# Patient Record
Sex: MALE | Race: WHITE | NOT HISPANIC OR LATINO | Employment: FULL TIME | URBAN - METROPOLITAN AREA
[De-identification: names, ages, dates, MRNs, and addresses within clinical notes are randomized per-mention and may not be internally consistent; named-entity substitution may affect disease eponyms.]

---

## 2021-06-02 ENCOUNTER — OFFICE VISIT (OUTPATIENT)
Dept: ORTHOPEDICS | Facility: CLINIC | Age: 56
End: 2021-06-02
Payer: COMMERCIAL

## 2021-06-02 ENCOUNTER — ANCILLARY PROCEDURE (OUTPATIENT)
Dept: GENERAL RADIOLOGY | Facility: CLINIC | Age: 56
End: 2021-06-02
Attending: FAMILY MEDICINE
Payer: COMMERCIAL

## 2021-06-02 VITALS
HEIGHT: 76 IN | SYSTOLIC BLOOD PRESSURE: 136 MMHG | WEIGHT: 185.2 LBS | DIASTOLIC BLOOD PRESSURE: 89 MMHG | BODY MASS INDEX: 22.55 KG/M2

## 2021-06-02 DIAGNOSIS — G89.29 CHRONIC PAIN IN RIGHT SHOULDER: Primary | ICD-10-CM

## 2021-06-02 DIAGNOSIS — M25.511 RIGHT SHOULDER PAIN: ICD-10-CM

## 2021-06-02 DIAGNOSIS — M25.511 CHRONIC PAIN IN RIGHT SHOULDER: Primary | ICD-10-CM

## 2021-06-02 PROCEDURE — 99203 OFFICE O/P NEW LOW 30 MIN: CPT | Performed by: FAMILY MEDICINE

## 2021-06-02 PROCEDURE — 73030 X-RAY EXAM OF SHOULDER: CPT | Mod: RT | Performed by: RADIOLOGY

## 2021-06-02 RX ORDER — AMLODIPINE BESYLATE 5 MG/1
TABLET ORAL
COMMUNITY
Start: 2020-11-06

## 2021-06-02 RX ORDER — LOSARTAN POTASSIUM 25 MG/1
25 TABLET ORAL
COMMUNITY
Start: 2020-12-02 | End: 2021-12-02

## 2021-06-02 ASSESSMENT — MIFFLIN-ST. JEOR: SCORE: 1777.59

## 2021-06-02 NOTE — PATIENT INSTRUCTIONS
Thank you for choosing [facility]    DR LARSEN'S CLINIC LOCATIONS  [place] [address] [address]. [facility] [address] [facility], 4th [place]   [place], [place], [address] [place] [address]   [phone]       APPOINTMENTS: [phone]    BILLING QUESTIONS: [phone]    FAX NUMBER: [phone]        Follow up: follow up for in person, telephone, or virtual visit after your MRI to review results.       1. Chronic pain in right shoulder        An order for an MRI was placed today. You may call directly to schedule at [phone] at your convenience.

## 2021-06-02 NOTE — PROGRESS NOTES
"CHIEF COMPLAINT:  Pain of the Right Shoulder       HISTORY OF PRESENT ILLNESS  Mr. Mitchell is a pleasant 55 year old year old male who presents to clinic today with right shoulder pain.  Demetrius explains that he works for [employer] for the past 32 years and over the years his job has \"taken it's toll\".   He states at work he has to often lift heavy boxes of paper above his head.     Onset: gradual, worsening  Location: right shoulder, lateral shoulder, points to acromion process  Quality:  Aching while sleeping, sharp and shooting with certain movements.   Duration: 8-12 months   Severity: 8/10 at worst  Timing:intermittent episodes depending on activity.   Modifying factors: worse with lateral shoulder abduction resting and non-use makes it better, movement and use makes it worse  Associated signs & symptoms: clicking, popping, grinding, no catching  Previous similar pain: No  Treatments to date: some physical therapy just before COVID.     Additional history: as documented    Review of Systems:    Have you recently had a a fever, chills, weight loss? No    Do you have any vision problems? No    Do you have any chest pain or edema? No    Do you have any shortness of breath or wheezing?  No    Do you have stomach problems? No    Do you have any numbness or focal weakness? No    Do you have diabetes? No    Do you have problems with bleeding or clotting? No    Do you have an rashes or other skin lesions? No    MEDICAL HISTORY  There is no problem list on file for this patient.      Current Outpatient Medications   Medication Sig Dispense Refill     amLODIPine (NORVASC) 5 MG tablet TK 1 T PO D       losartan (COZAAR) 25 MG tablet Take 25 mg by mouth         Allergies   Allergen Reactions     Amoxicillin      PN: LW Reaction: HIVES       No family history on file.    Additional medical/Social/Surgical histories reviewed in [facility] and updated as appropriate.       PHYSICAL EXAM  /89   Ht 1.924 m (6' 3.75\")   Wt 84 kg " (185 lb 3.2 oz)   BMI 22.69 kg/m      General  - normal appearance, in no obvious distress  Musculoskeletal - Right shoulder  - inspection: normal bone and joint alignment, no obvious deformity, no scapular winging, no AC step-off  - palpation: mildly tender RC insertion, normal clavicle, non-tender AC joint, palpable prominence of distal calvicle.  palpable crepitus deep in shoulder.  - ROM:  painful and limited flexion and ER at end range, limited IR, audible crepitus,   - strength: 5/5  strength, 5/5 in all shoulder planes  - special tests:  (-) Speed's  (-) Neer  (-) Hawkin's  (+) Kody's pain and weakness  (-) [place]'s  (-) apprehension  (-) subscap lift-off  Neuro  - no sensory or motor deficit, grossly normal coordination, normal muscle tone  Skin  - no ecchymosis, erythema, warmth, or induration, no obvious rash  Psych  - interactive, appropriate, normal mood and affect    IMAGING : XR Shoulder Right 3V. Final results and radiologist's interpretation, available in the [facility] health record. Images were reviewed with the patient/family members in the office today. My personal interpretation of the performed imaging is no acute osseous abnormality.  Prominent distal clavicle.      ASSESSMENT & PLAN  Mr. Mitchell is a 55 year old year old male who presents to clinic today with chronic right shoulder pain, crepitation.  Demetrius states pain has been present over the past two years, refractory to physical therapy prior to pandemic at outside facility.    Diagnosis: Chronic pain of right shoulder with exacerbation    Given lack of improvement, I will pursue MRI right shoulder as he has been experiencing painful crepitation, weakness and pain affecting his quality of sleep.  Follow up after MRI right shoulder virtually or in person.    It was a pleasure seeing Demetrius today.    Dennis Hayes, , [facility]  Primary Care Sports Medicine

## 2021-06-02 NOTE — LETTER
"    6/2/2021         RE: Demetrius Mitchell  [address]  [place] [facility] [address]        Dear Colleague,    Thank you for referring your patient, Demetrius Mitchell, to the [facility] [place]. Please see a copy of my visit note below.    CHIEF COMPLAINT:  Pain of the Right Shoulder       HISTORY OF PRESENT ILLNESS  Mr. Mitchell is a pleasant 55 year old year old male who presents to clinic today with right shoulder pain.  Demetrius explains that he works for [employer] for the past 32 years and over the years his job has \"taken it's toll\".   He states at work he has to often lift heavy boxes of paper above his head.     Onset: gradual, worsening  Location: right shoulder, lateral shoulder, points to acromion process  Quality:  Aching while sleeping, sharp and shooting with certain movements.   Duration: 8-12 months   Severity: 8/10 at worst  Timing:intermittent episodes depending on activity.   Modifying factors: worse with lateral shoulder abduction resting and non-use makes it better, movement and use makes it worse  Associated signs & symptoms: clicking, popping, grinding, no catching  Previous similar pain: No  Treatments to date: some physical therapy just before COVID.     Additional history: as documented    Review of Systems:    Have you recently had a a fever, chills, weight loss? No    Do you have any vision problems? No    Do you have any chest pain or edema? No    Do you have any shortness of breath or wheezing?  No    Do you have stomach problems? No    Do you have any numbness or focal weakness? No    Do you have diabetes? No    Do you have problems with bleeding or clotting? No    Do you have an rashes or other skin lesions? No    MEDICAL HISTORY  There is no problem list on file for this patient.      Current Outpatient Medications   Medication Sig Dispense Refill     amLODIPine (NORVASC) 5 MG tablet TK 1 T PO D       losartan (COZAAR) 25 MG tablet Take 25 mg by mouth   " "      Allergies   Allergen Reactions     Amoxicillin      PN: LW Reaction: HIVES       No family history on file.    Additional medical/Social/Surgical histories reviewed in [facility] and updated as appropriate.       PHYSICAL EXAM  /89   Ht 1.924 m (6' 3.75\")   Wt 84 kg (185 lb 3.2 oz)   BMI 22.69 kg/m      General  - normal appearance, in no obvious distress  Musculoskeletal - Right shoulder  - inspection: normal bone and joint alignment, no obvious deformity, no scapular winging, no AC step-off  - palpation: mildly tender RC insertion, normal clavicle, non-tender AC joint, palpable prominence of distal calvicle.  palpable crepitus deep in shoulder.  - ROM:  painful and limited flexion and ER at end range, limited IR, audible crepitus,   - strength: 5/5  strength, 5/5 in all shoulder planes  - special tests:  (-) Speed's  (-) Neer  (-) Hawkin's  (+) Kody's pain and weakness  (-) [place]'s  (-) apprehension  (-) subscap lift-off  Neuro  - no sensory or motor deficit, grossly normal coordination, normal muscle tone  Skin  - no ecchymosis, erythema, warmth, or induration, no obvious rash  Psych  - interactive, appropriate, normal mood and affect    IMAGING : XR Shoulder Right 3V. Final results and radiologist's interpretation, available in the [facility] health record. Images were reviewed with the patient/family members in the office today. My personal interpretation of the performed imaging is no acute osseous abnormality.  Prominent distal clavicle.      ASSESSMENT & PLAN  Mr. Mitchell is a 55 year old year old male who presents to clinic today with chronic right shoulder pain, crepitation.  Demetrius states pain has been present over the past two years, refractory to physical therapy prior to pandemic at outside facility.    Diagnosis: Chronic pain of right shoulder with exacerbation    Given lack of improvement, I will pursue MRI right shoulder as he has been experiencing painful crepitation, weakness and pain " affecting his quality of sleep.  Follow up after MRI right shoulder virtually or in person.    It was a pleasure seeing Demetrius today.    Dennis Hayes DO, [facility]  Primary Care Sports Medicine        Again, thank you for allowing me to participate in the care of your patient.        Sincerely,        Dennis Hayes DO

## 2021-06-22 ENCOUNTER — HOSPITAL ENCOUNTER (OUTPATIENT)
Dept: MRI IMAGING | Facility: CLINIC | Age: 56
Discharge: HOME | End: 2021-06-22
Attending: FAMILY MEDICINE | Admitting: FAMILY MEDICINE
Payer: COMMERCIAL

## 2021-06-22 DIAGNOSIS — M25.511 CHRONIC PAIN IN RIGHT SHOULDER: ICD-10-CM

## 2021-06-22 DIAGNOSIS — G89.29 CHRONIC PAIN IN RIGHT SHOULDER: ICD-10-CM

## 2021-06-22 PROCEDURE — 73221 MRI JOINT UPR EXTREM W/O DYE: CPT | Mod: RT

## 2021-06-22 PROCEDURE — 73221 MRI JOINT UPR EXTREM W/O DYE: CPT | Mod: 26 | Performed by: RADIOLOGY

## 2021-06-30 ENCOUNTER — OFFICE VISIT (OUTPATIENT)
Dept: ORTHOPEDICS | Facility: CLINIC | Age: 56
End: 2021-06-30
Payer: COMMERCIAL

## 2021-06-30 VITALS — WEIGHT: 185 LBS | BODY MASS INDEX: 22.53 KG/M2 | HEIGHT: 76 IN

## 2021-06-30 DIAGNOSIS — G89.29 CHRONIC PAIN IN RIGHT SHOULDER: Primary | ICD-10-CM

## 2021-06-30 DIAGNOSIS — M25.511 CHRONIC PAIN IN RIGHT SHOULDER: Primary | ICD-10-CM

## 2021-06-30 PROCEDURE — 99213 OFFICE O/P EST LOW 20 MIN: CPT | Performed by: FAMILY MEDICINE

## 2021-06-30 ASSESSMENT — MIFFLIN-ST. JEOR: SCORE: 1776.68

## 2021-06-30 NOTE — LETTER
6/30/2021         RE: Demetrius Mitchell  [address]  [place] [facility] [place] [address]        Dear Colleague,    Thank you for referring your patient, Demetrius Mitchell, to the [facility]TS [facility]ICINE CLINIC [place]. Please see a copy of my visit note below.    ESTABLISHED PATIENT FOLLOW-UP:  Follow Up, Pain, and Results of the Right Shoulder       HISTORY OF PRESENT ILLNESS  Mr. Mitchell is a pleasant 55 year old year old male who presents to clinic today for follow-up of right shoulder pain. He is here to review a right shoulder MRI.    Date of injury/onset: 8-12 months of chronic right shoulder pain  Date last seen: 6/2/2021  Following Therapeutic Plan: completed MRI 6/22/21  Pain: Minimal change in overall - located over deep posterior shoulder  Function: no change    Review of Systems:  A 10-point review of systems was obtained and is negative except for as noted in the HPI.       MEDICAL HISTORY  There is no problem list on file for this patient.      Current Outpatient Medications   Medication Sig Dispense Refill     amLODIPine (NORVASC) 5 MG tablet TK 1 T PO D       losartan (COZAAR) 25 MG tablet Take 25 mg by mouth         Allergies   Allergen Reactions     Amoxicillin      PN: LW Reaction: HIVES       Family History   Problem Relation Age of Onset     Breast Cancer Mother      Allergies Father        Additional medical/Social/Surgical histories reviewed in [facility] and updated as appropriate.       PHYSICAL EXAM  There were no vitals taken for this visit.    Deferred    IMAGING : MRI right shoulder without contrast    Results for orders placed or performed during the hospital encounter of 06/22/21   MR Shoulder Right w/o Contrast    Narrative    EXAM: MR Right shoulder without  contrast 6/22/2021 4:24 PM    TECHNIQUE: Multiplanar, multisequence imaging of the right shoulder  were obtained without administration of intravenous or intra-articular  gadolinium contrast using routine protocol.    History:  Chronic pain in right shoulder; Chronic pain in right  shoulder     Comparison: Radiographs 6/2/2021    Findings:    ROTATOR CUFF and ASSOCIATED STRUCTURES  Rotator cuff: Tendinosis of the supraspinatus with full-thickness and  full width tearing with muscular tendinous retraction to the level of  the center of the humeral head. Infraspinatus tendinosis with  low-grade articular sided tearing of the anterior fibers at the  footprint. Teres minor is intact. Subscapularis tendinosis with  low-grade articular sided tearing of the upper fibers at the  footprint.    Bursa: Moderate fluid in the subacromial-subdeltoid bursa with debris.    Musculature: Muscle bulk of rotator cuff is preserved.  Deltoid muscle  bulk is also preserved.  No muscle edema.    Acromioclavicular joint  There are mild degenerative changes of the acromioclavicular joint.  Acromion is type 2 in sagittal morphology.  Coracoacromial ligament is  not thickened.    OSSEOUS STRUCTURES  No fracture, marrow contusion or marrow infiltration. The marrow  reconversion of the humeral shaft.    LONG BICIPITAL TENDON  The long head of the biceps tendon is normally situated within the  bicipital groove. No complete or partial biceps tendon tear is  present. Moderate fluid about the proximal extra-articular long head  of the biceps tendon.    GLENOHUMERAL JOINT  Joint fluid: Moderate joint effusion.    Cartilage and subarticular bone:  No focal hyaline cartilage defects  are noted. No Hill-Sachs, reverse Hill-Sachs, or bony [place] lesions  are seen.    Labrum: Limited assessment on this study with relative lack of joint  distention shows no labral tear.    ANCILLARY FINDINGS:  None      Impression    Impression:    1. Tendinosis of the supraspinatus with full-thickness and full width  tearing with myotendinous retraction to the level of the center of the  humeral head.     2. Infraspinatus tendinosis with low-grade articular sided tearing of  the anterior fibers  at the footprint.    3. Subscapularis tendinosis with low-grade articular sided tearing of  the upper fibers at the footprint.    4. Tenosynovitis of the extra-articular long head of the biceps  tendon.    5. Moderate joint effusion. Subacromial subdeltoid bursitis.    PALOMA SARMIENTO MD (Joe)        ASSESSMENT & PLAN  Mr. Mitchell is a 55 year old year old male who presents to clinic today with Follow Up, Pain, and Results of the Right Shoulder    Diagnosis:  Full  Thickness, full width supraspinatus tear of right shoulder    Demetrius I had a alphonse discussion about the nature of his supraspinatus tear.  Given that is a full-thickness, full width tear has been recalcitrant to formal physical therapy in the past, I do think he would best be served with a surgical consultation.  He does have additional tenosynovitis of his biceps, so tenodesis may also be considered.    He will continue with conservative measures, I will refer him on to see Dr. Jarquin done in [place].  All questions were answered to her satisfaction    It was a pleasure seeing Demetrius.    Dennis Hayes DO, [facility]  Primary Care Sports Medicine          Again, thank you for allowing me to participate in the care of your patient.        Sincerely,        Dennis Hayes DO

## 2021-07-12 NOTE — TELEPHONE ENCOUNTER
RECORDS RECEIVED FROM: Chronic pain in right shoulder/MRI/XR/Dennis Hayes DO/[facility]   DATE RECEIVED: Jul 21, 2021     NOTES STATUS DETAILS   OFFICE NOTE from referring provider Internal  Dennis Hayes DO      OFFICE NOTE from other specialist N/A    DISCHARGE SUMMARY from hospital N/A    DISCHARGE REPORT from the ER N/A    OPERATIVE REPORT N/A    MEDICATION LIST Internal    IMPLANT RECORD/STICKER N/A    LABS     CBC/DIFF N/A    CULTURES N/A    INJECTIONS DONE IN RADIOLOGY N/A    MRI Internal    CT SCAN N/A    XRAYS (IMAGES & REPORTS) Internal    TUMOR     PATHOLOGY  Slides & report N/A      07/12/21   2:11 PM   COMPLETE  Radha De La Paz, CMA

## 2021-07-21 ENCOUNTER — PRE VISIT (OUTPATIENT)
Dept: ORTHOPEDICS | Facility: CLINIC | Age: 56
End: 2021-07-21

## 2021-07-28 ENCOUNTER — OFFICE VISIT (OUTPATIENT)
Dept: ORTHOPEDICS | Facility: CLINIC | Age: 56
End: 2021-07-28
Payer: COMMERCIAL

## 2021-07-28 ENCOUNTER — HOSPITAL ENCOUNTER (OUTPATIENT)
Facility: AMBULATORY SURGERY CENTER | Age: 56
End: 2021-07-28
Attending: ORTHOPAEDIC SURGERY
Payer: COMMERCIAL

## 2021-07-28 VITALS — BODY MASS INDEX: 22.52 KG/M2 | HEIGHT: 76 IN | WEIGHT: 184.97 LBS

## 2021-07-28 DIAGNOSIS — S46.011A TRAUMATIC COMPLETE TEAR OF RIGHT ROTATOR CUFF, INITIAL ENCOUNTER: ICD-10-CM

## 2021-07-28 DIAGNOSIS — S46.011A TRAUMATIC COMPLETE TEAR OF RIGHT ROTATOR CUFF, INITIAL ENCOUNTER: Primary | ICD-10-CM

## 2021-07-28 PROCEDURE — 99204 OFFICE O/P NEW MOD 45 MIN: CPT | Mod: GC | Performed by: ORTHOPAEDIC SURGERY

## 2021-07-28 ASSESSMENT — MIFFLIN-ST. JEOR: SCORE: 1776.5

## 2021-07-28 NOTE — LETTER
7/28/2021         RE: Demetrius Mitchell  [address]  [place] [address]        Dear Colleague,    Thank you for referring your patient, Demetrius Mitchell, to the [facility] [place]. Please see a copy of my visit note below.    CHIEF CONCERN: right shoulder pain    HISTORY OF PRESENT ILLNESS: Demetrius Mitchell is a pleasant otherwise healthy 55 year old male who presents for an evaluation of the right shoulder.  He has a history of a couple years of mild shoulders aches/pains which worsened and localized to anterolateral shoulder pain off and on over some time.  In approx Sept 2020 he was lifting some heavy packages/bags overhead and then noticed increased pain later that day.  He reported the incident to work.  He has no radiation of pain to the elbow or hand. No neurological symptoms.  The pain has begun to severely limit not only his preferred activities like golf and biking, but also his daily activities and quality of life. He has been having pain at night which prevents him from sleeping.  It has been difficult for him to work [place] [employer] [place]  To date he has followed with Dr. Hayes with x-rays and an MRI. He did formal physical therapy about a year ago.  He has never had any sort of injection into the shoulder.  He wishes to discuss more definitive intervention.    Past Medical History:   Diagnosis Date     Hypertension      No past surgical history on file.     No history of DVT/PE  Not on anticoagulation    Current Outpatient Medications   Medication Sig Dispense Refill     amLODIPine (NORVASC) 5 MG tablet TK 1 T PO D       losartan (COZAAR) 25 MG tablet Take 25 mg by mouth       Allergies   Allergen Reactions     Amoxicillin      PN: LW Reaction: HIVES     SOCIAL HISTORY:    Social History     Socioeconomic History     Marital status: Single     Spouse name: Not on file     Number of children: Not on file     Years of education:  Not on file     Highest education level: Not on file   Occupational History     Not on file   Tobacco Use     Smoking status: Never Smoker     Smokeless tobacco: Never Used   Substance and Sexual Activity     Alcohol use: Not on file     Drug use: Not on file     Sexual activity: Not on file   Other Topics Concern     Not on file   Social History Narrative     Not on file     Social Determinants of Health     Financial Resource Strain:      Difficulty of Paying Living Expenses:    Food Insecurity:      Worried About Running Out of Food in the Last Year:      Ran Out of Food in the Last Year:    Transportation Needs:      Lack of Transportation (Medical):      Lack of Transportation (Non-Medical):    Physical Activity:      Days of Exercise per Week:      Minutes of Exercise per Session:    Stress:      Feeling of Stress :    Social Connections:      Frequency of Communication with Friends and Family:      Frequency of Social Gatherings with Friends and Family:      Attends [employer] Services:      Active Member of Clubs or Organizations:      Attends Club or Organization Meetings:      Marital Status:    Intimate Partner Violence:      Fear of Current or Ex-Partner:      Emotionally Abused:      Physically Abused:      Sexually Abused:      Lives in [place]. Never smoker.    FAMILY HISTORY: Reviewed in EMR.      REVIEW OF SYSTEMS: Positive for that noted in past medical history and history of present illness and otherwise reviewed in EMR.     PHYSICAL EXAM:    Adult male in no acute distress. Articulates and communicates with normal affect.  Respirations even and unlabored.  Focused upper extremity exam:   Examination of the right shoulder demonstrates that his skin is intact without erythema. He is neurovascularly intact. He has active forward flexion to 180 and external rotation to 90. Positive Kody's test with 4- out of 5 supraspinatus strength testing. Internal rotation to T12. 5 out of 5 external rotation  strength. Negative belly press. Positive speeds and tender at biceps groove.    IMAGING:  Three x-ray views of the right shoulder taken on 6/02/21 were reviewed which demonstrate normal-appearing bony architecture without evidence of arthrosis. An MRI of the right shoulder taken on 6/22/21 was also reviewed which shows evidence of a full-thickness and full-width supraspinatus tear as well as partial tearing of subscapularis, infraspinatus, and biceps tendinosis.    ASSESSMENT:    1. Right full thickness full width supraspinatus tendon tear  2. Right shoulder biceps tendonitis    PLAN:  We discussed the natural history of rotator cuff tears including that they do not heal on their own. We discussed the risks and benefits of both non-operative and operative treatment options with risks of surgery including but not limited to bleeding, infection, failure of the cuff to heal, anesthesia risks, injury to nerves or vessels which power the arm or hand. We discussed the postoperative restrictions and rehab course. Given the patient's current significant functional limitations and pain impacting activities of daily living and quality of life, he would prefer to proceed with operative treatment.  We also discussed management of the long head of the biceps with tenodesis. He would like to proceed with surgical scheduling and we will work with him in this regard. Surgical plan will be for arthroscopic cuff repair, subacromial decompression, open biceps tenodesis.    Patient discussed and seen with Dr. Britton MD, orthopedic surgery staff.     --  Godfrey Tapia MD  Orthopedic Surgery PGY-1    I have personally examined this patient and have reviewed the clinical presentation and progress note with the resident.  I agree with the treatment plan as outlined.  The plan was formulated with the resident on the day of the resident's note.       Dolores Jarquin MD

## 2021-07-28 NOTE — NURSING NOTE
"Reason For Visit:   Chief Complaint   Patient presents with     Consult     Chronic pain in right shoulder Ref. Dennis Hayes        PCP: Mark Quiroz  Ref: Dr. Hayes    ?  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  Currently working? Yes.  Work status?  Full time.  Date of injury: Chronic from activities at work    Date of surgery: NA  Type of surgery: NA.  Smoker: No  Request smoking cessation information: No    Right hand dominant    SANE score  Affected shoulder: Right  Right shoulder SANE: 50-60  Left shoulder SANE: 70-80    Ht 1.924 m (6' 3.75\")   Wt 83.9 kg (184 lb 15.5 oz)   BMI 22.66 kg/m        Pain Assessment  Patient Currently in Pain: Denies (Pain worse at night and OK through out the day)    Klelie Quezada ATC        "

## 2021-07-28 NOTE — PROGRESS NOTES
CHIEF CONCERN: right shoulder pain    HISTORY OF PRESENT ILLNESS: Demetrius Mitchell is a pleasant otherwise healthy 55 year old male who presents for an evaluation of the right shoulder.  He has a history of a couple years of mild shoulders aches/pains which worsened and localized to anterolateral shoulder pain off and on over some time.  In approx Sept 2020 he was lifting some heavy packages/bags overhead and then noticed increased pain later that day.  He reported the incident to work.  He has no radiation of pain to the elbow or hand. No neurological symptoms.  The pain has begun to severely limit not only his preferred activities like golf and biking, but also his daily activities and quality of life. He has been having pain at night which prevents him from sleeping.  It has been difficult for him to work because [place] [employer] [place].  To date he has followed with Dr. Hayes with x-rays and an MRI. He did formal physical therapy about a year ago.  He has never had any sort of injection into the shoulder.  He wishes to discuss more definitive intervention.    Past Medical History:   Diagnosis Date     Hypertension      No past surgical history on file.     No history of DVT/PE  Not on anticoagulation    Current Outpatient Medications   Medication Sig Dispense Refill     amLODIPine (NORVASC) 5 MG tablet TK 1 T PO D       losartan (COZAAR) 25 MG tablet Take 25 mg by mouth       Allergies   Allergen Reactions     Amoxicillin      PN: LW Reaction: HIVES     SOCIAL HISTORY:    Social History     Socioeconomic History     Marital status: Single     Spouse name: Not on file     Number of children: Not on file     Years of education: Not on file     Highest education level: Not on file   Occupational History     Not on file   Tobacco Use     Smoking status: Never Smoker     Smokeless tobacco: Never Used   Substance and Sexual Activity     Alcohol use: Not on file     Drug use: Not on file     Sexual  activity: Not on file   Other Topics Concern     Not on file   Social History Narrative     Not on file     Social Determinants of Health     Financial Resource Strain:      Difficulty of Paying Living Expenses:    Food Insecurity:      Worried About Running Out of Food in the Last Year:      Ran Out of Food in the Last Year:    Transportation Needs:      Lack of Transportation (Medical):      Lack of Transportation (Non-Medical):    Physical Activity:      Days of Exercise per Week:      Minutes of Exercise per Session:    Stress:      Feeling of Stress :    Social Connections:      Frequency of Communication with Friends and Family:      Frequency of Social Gatherings with Friends and Family:      Attends Buddhist Services:      Active Member of Clubs or Organizations:      Attends Club or Organization Meetings:      Marital Status:    Intimate Partner Violence:      Fear of Current or Ex-Partner:      Emotionally Abused:      Physically Abused:      Sexually Abused:      Lives in [place]. Never smoker.    FAMILY HISTORY: Reviewed in EMR.      REVIEW OF SYSTEMS: Positive for that noted in past medical history and history of present illness and otherwise reviewed in EMR.     PHYSICAL EXAM:    Adult male in no acute distress. Articulates and communicates with normal affect.  Respirations even and unlabored.  Focused upper extremity exam:   Examination of the right shoulder demonstrates that his skin is intact without erythema. He is neurovascularly intact. He has active forward flexion to 180 and external rotation to 90. Positive Kody's test with 4- out of 5 supraspinatus strength testing. Internal rotation to T12. 5 out of 5 external rotation strength. Negative belly press. Positive speeds and tender at biceps groove.    IMAGING:  Three x-ray views of the right shoulder taken on 6/02/21 were reviewed which demonstrate normal-appearing bony architecture without evidence of arthrosis. An MRI of the right shoulder  taken on 6/22/21 was also reviewed which shows evidence of a full-thickness and full-width supraspinatus tear as well as partial tearing of subscapularis, infraspinatus, and biceps tendinosis.    ASSESSMENT:    1. Right full thickness full width supraspinatus tendon tear  2. Right shoulder biceps tendonitis    PLAN:  We discussed the natural history of rotator cuff tears including that they do not heal on their own. We discussed the risks and benefits of both non-operative and operative treatment options with risks of surgery including but not limited to bleeding, infection, failure of the cuff to heal, anesthesia risks, injury to nerves or vessels which power the arm or hand. We discussed the postoperative restrictions and rehab course. Given the patient's current significant functional limitations and pain impacting activities of daily living and quality of life, he would prefer to proceed with operative treatment.  We also discussed management of the long head of the biceps with tenodesis. He would like to proceed with surgical scheduling and we will work with him in this regard. Surgical plan will be for arthroscopic cuff repair, subacromial decompression, open biceps tenodesis.    Patient discussed and seen with Dr. Britton MD, orthopedic surgery staff.     --  Godfrey Tapia MD  Orthopedic Surgery PGY-1    I have personally examined this patient and have reviewed the clinical presentation and progress note with the resident.  I agree with the treatment plan as outlined.  The plan was formulated with the resident on the day of the resident's note.

## 2021-12-16 ENCOUNTER — TELEPHONE (OUTPATIENT)
Dept: ORTHOPEDICS | Facility: CLINIC | Age: 56
End: 2021-12-16
Payer: COMMERCIAL

## 2021-12-16 NOTE — TELEPHONE ENCOUNTER
Attempted to reach patient about scheduling surgery with Dr. Jarquin. Mailbox was full and  was unable to leave a message

## 2021-12-17 ENCOUNTER — HOSPITAL ENCOUNTER (OUTPATIENT)
Facility: AMBULATORY SURGERY CENTER | Age: 56
End: 2021-12-17
Attending: ORTHOPAEDIC SURGERY
Payer: COMMERCIAL

## 2021-12-17 NOTE — TELEPHONE ENCOUNTER
Called and left voicemail for patient about scheduling surgery with Dr. Jarquin. Gave [phone] as call back number.

## 2021-12-21 NOTE — TELEPHONE ENCOUNTER
Called and left voicemail for patient about scheduling surgery with Dr. Jarquin. Gave [phone] as call back number.

## 2025-06-06 NOTE — NURSING NOTE
Continue CIWA protocol.  Continue to encourage complete cessation.  Continue to encourage rehabilitation at discharge.   Teaching Flowsheet   Relevant Diagnosis: Traumatic Right rotator cuff tear  Teaching Topic: Preop Teaching Right arthroscopic cuff repair, subacromial decompression, open biceps tenodesis     Pt lives locally, [employer] work comp injury to be approved before scheduling, health hx includes hypertension on meds, this is his first surgery.    Person(s) involved in teaching:   Patient and friend     Motivation Level:  Asks Questions: Yes  Eager to Learn: Yes  Cooperative: Yes  Receptive (willing/able to accept information): Yes  Any cultural factors/Moravian beliefs that may influence understanding or compliance? No       Patient and Family demonstrates understanding of the following:  Reason for the appointment, diagnosis and treatment plan: Yes  Knowledge of proper use of medications and conditions for which they are ordered (with special attention to potential side effects or drug interactions): Yes  Which situations necessitate calling provider and whom to contact: Yes       Teaching Concerns Addressed:        Proper use and care of sling, dressings, meds (medical equip, care aids, etc.): Yes  Nutritional needs and diet plan: Yes  Pain management techniques: Yes  Wound Care: Yes  How and/when to access community resources: NA     Instructional Materials Used/Given: Preop Packet and Antiseptic Soap       Time spent with patient: 15 minutes.